# Patient Record
Sex: FEMALE
[De-identification: names, ages, dates, MRNs, and addresses within clinical notes are randomized per-mention and may not be internally consistent; named-entity substitution may affect disease eponyms.]

---

## 2024-04-17 ENCOUNTER — APPOINTMENT (OUTPATIENT)
Dept: ORTHOPEDIC SURGERY | Facility: CLINIC | Age: 19
End: 2024-04-17
Payer: COMMERCIAL

## 2024-04-17 VITALS
BODY MASS INDEX: 31.39 KG/M2 | WEIGHT: 200 LBS | DIASTOLIC BLOOD PRESSURE: 73 MMHG | HEIGHT: 67 IN | HEART RATE: 100 BPM | SYSTOLIC BLOOD PRESSURE: 110 MMHG

## 2024-04-17 DIAGNOSIS — S82.899A OTHER FRACTURE OF UNSPECIFIED LOWER LEG, INITIAL ENCOUNTER FOR CLOSED FRACTURE: ICD-10-CM

## 2024-04-17 DIAGNOSIS — M25.579 PAIN IN UNSPECIFIED ANKLE AND JOINTS OF UNSPECIFIED FOOT: ICD-10-CM

## 2024-04-17 PROBLEM — Z00.00 ENCOUNTER FOR PREVENTIVE HEALTH EXAMINATION: Status: ACTIVE | Noted: 2024-04-17

## 2024-04-17 PROCEDURE — 99205 OFFICE O/P NEW HI 60 MIN: CPT

## 2024-04-17 PROCEDURE — 73610 X-RAY EXAM OF ANKLE: CPT | Mod: RT

## 2024-04-17 NOTE — DISCUSSION/SUMMARY
[de-identified] : 20yo healthy F pw bimalleolar ankle fracture. The risks, benefits and alternatives to surgery were discussed.   The patient understands the risks include but are not limited to bleeding, infection, wound healing issues, damage to surrounding structures including nerves and arteries, revision surgery, symptomatic hardware, arthritis/arthrosis, and the inability of the surgery to reduce the pain.  No guarantees were given regarding the surgery.  They understand there is a risk of loss of limb, extremity and life.  We discussed the proposed rehabilitation timeline as well as expected postoperative restrictions. The patient voiced a good understanding of treatment options, risks and benefits, postoperative instructions, rehabilitation timeline, and restrictions. The patient was given the opportunity to ask questions, which were all answered to the best of my ability and to their satisfaction. The patient will work with my office to arrange a time for surgery and obtain any medical clearance information necessary. The patient expressed understanding of his diagnosis and treatment plan and all questions were answered.  The patient was extensively counseled on treatment options including but not limited to observation, rest/activity modification, bracing, anti-inflammatory medications, physical therapy, injections, and surgery.  The natural history of the disease was thoroughly explained.     I have personally obtained the history, reviewed the ROS as noted, and performed the physical examination today.  The patient and I discussed the assessment and options and developed the plan.  All questions were answered and the patient stated their understanding of the treatment plan and appreciation of the visit.   My cumulative time spent on this patient's visit included: Preparation for the visit, review of the medical records, review of pertinent diagnostic studies, examination and counseling of the patient on the above diagnosis, treatment plan and prognosis, orders of diagnostic tests, medications and/or appropriate procedures and documentation in the medical records of today's visit.   Mark Smith MD

## 2024-04-17 NOTE — PHYSICAL EXAM
[de-identified] : Constitutional: Well-nourished, well-developed, No acute distress  Respiratory:  Good respiratory effort, no SOB  Psychiatric: Pleasant and normal affect, alert and oriented x3   Foot: Skin: Clean dry and intact  Ankle: nontender, no proximal fibular pain Tenderness: distal 1/3 fibula, med mal - no open injury firing ehl ta gs SILT L3-S1 2+ dp bcr  [de-identified] : The following radiographs were ordered and read by me during this patient's visit. I reviewed each radiograph in detail with the patient and discussed the findings as highlighted below.   3 views of the R ankle were obtained today that show medial malleolus and high distal fibula frx. There are no degenerative changes seen. No obvious osseous abnormality. Otherwise unremarkable.

## 2024-04-17 NOTE — HISTORY OF PRESENT ILLNESS
[de-identified] : 19yF, [place] [employer] sophomore, pw R ankle pain after a fall.  She slipped last weekend and was unable to ambulate - went to ER where bimalleolar fracture diagnosed.  Due to insurance reasons, placed in splint and returned to [place] where her family lives in [place]. No n/p.  Moderate pain.  No open injury.  No hx frx.

## 2024-04-23 ENCOUNTER — APPOINTMENT (OUTPATIENT)
Dept: ORTHOPEDIC SURGERY | Facility: HOSPITAL | Age: 19
End: 2024-04-23